# Patient Record
Sex: MALE | Race: WHITE | NOT HISPANIC OR LATINO | URBAN - METROPOLITAN AREA
[De-identification: names, ages, dates, MRNs, and addresses within clinical notes are randomized per-mention and may not be internally consistent; named-entity substitution may affect disease eponyms.]

---

## 2023-07-19 ENCOUNTER — PRE-ADMISSION TESTING (OUTPATIENT)
Dept: PREADMISSION TESTING | Facility: HOSPITAL | Age: 48
End: 2023-07-19
Payer: COMMERCIAL

## 2023-07-19 VITALS — BODY MASS INDEX: 36.64 KG/M2 | WEIGHT: 276.46 LBS | HEIGHT: 73 IN

## 2023-07-19 LAB
ALBUMIN SERPL-MCNC: 4.2 G/DL (ref 3.5–5.2)
ALBUMIN/GLOB SERPL: 1.4 G/DL
ALP SERPL-CCNC: 98 U/L (ref 39–117)
ALT SERPL W P-5'-P-CCNC: 35 U/L (ref 1–41)
ANION GAP SERPL CALCULATED.3IONS-SCNC: 12 MMOL/L (ref 5–15)
AST SERPL-CCNC: 25 U/L (ref 1–40)
BILIRUB SERPL-MCNC: 0.6 MG/DL (ref 0–1.2)
BUN SERPL-MCNC: 9 MG/DL (ref 6–20)
BUN/CREAT SERPL: 9.7 (ref 7–25)
CALCIUM SPEC-SCNC: 9.1 MG/DL (ref 8.6–10.5)
CHLORIDE SERPL-SCNC: 105 MMOL/L (ref 98–107)
CO2 SERPL-SCNC: 24 MMOL/L (ref 22–29)
CREAT SERPL-MCNC: 0.93 MG/DL (ref 0.76–1.27)
DEPRECATED RDW RBC AUTO: 46.4 FL (ref 37–54)
EGFRCR SERPLBLD CKD-EPI 2021: 100 ML/MIN/1.73
ERYTHROCYTE [DISTWIDTH] IN BLOOD BY AUTOMATED COUNT: 14.5 % (ref 12.3–15.4)
GLOBULIN UR ELPH-MCNC: 3 GM/DL
GLUCOSE SERPL-MCNC: 100 MG/DL (ref 65–99)
HBA1C MFR BLD: 5.3 % (ref 4.8–5.6)
HCT VFR BLD AUTO: 48 % (ref 37.5–51)
HGB BLD-MCNC: 15.6 G/DL (ref 13–17.7)
MCH RBC QN AUTO: 28.4 PG (ref 26.6–33)
MCHC RBC AUTO-ENTMCNC: 32.5 G/DL (ref 31.5–35.7)
MCV RBC AUTO: 87.3 FL (ref 79–97)
PLATELET # BLD AUTO: 234 10*3/MM3 (ref 140–450)
PMV BLD AUTO: 10.1 FL (ref 6–12)
POTASSIUM SERPL-SCNC: 3.6 MMOL/L (ref 3.5–5.2)
PROT SERPL-MCNC: 7.2 G/DL (ref 6–8.5)
RBC # BLD AUTO: 5.5 10*6/MM3 (ref 4.14–5.8)
SODIUM SERPL-SCNC: 141 MMOL/L (ref 136–145)
WBC NRBC COR # BLD: 8.72 10*3/MM3 (ref 3.4–10.8)

## 2023-07-19 PROCEDURE — 36415 COLL VENOUS BLD VENIPUNCTURE: CPT

## 2023-07-19 PROCEDURE — 93005 ELECTROCARDIOGRAM TRACING: CPT

## 2023-07-19 PROCEDURE — 80053 COMPREHEN METABOLIC PANEL: CPT

## 2023-07-19 PROCEDURE — 85027 COMPLETE CBC AUTOMATED: CPT

## 2023-07-19 PROCEDURE — 83036 HEMOGLOBIN GLYCOSYLATED A1C: CPT

## 2023-07-19 RX ORDER — HEPARIN SODIUM 5000 [USP'U]/ML
5000 INJECTION, SOLUTION INTRAVENOUS; SUBCUTANEOUS ONCE
Status: CANCELLED | OUTPATIENT
Start: 2023-07-19 | End: 2023-07-19

## 2023-07-19 RX ORDER — LISINOPRIL 40 MG/1
40 TABLET ORAL
Status: ON HOLD | COMMUNITY
Start: 2023-04-07

## 2023-07-19 RX ORDER — ACETAMINOPHEN 500 MG
1000 TABLET ORAL ONCE
Status: CANCELLED | OUTPATIENT
Start: 2023-07-19 | End: 2023-07-19

## 2023-07-19 RX ORDER — MELOXICAM 15 MG/1
15 TABLET ORAL ONCE
Status: CANCELLED | OUTPATIENT
Start: 2023-07-19 | End: 2023-07-19

## 2023-07-19 RX ORDER — OMEPRAZOLE 20 MG/1
20 CAPSULE, DELAYED RELEASE ORAL DAILY
Status: ON HOLD | COMMUNITY

## 2023-07-19 RX ORDER — AMLODIPINE BESYLATE 10 MG/1
1 TABLET ORAL DAILY
Status: ON HOLD | COMMUNITY
Start: 2023-06-21

## 2023-07-19 RX ORDER — CARVEDILOL 6.25 MG/1
6.25 TABLET ORAL DAILY
Status: ON HOLD | COMMUNITY
Start: 2023-06-21

## 2023-07-19 RX ORDER — PREGABALIN 75 MG/1
75 CAPSULE ORAL ONCE
Status: CANCELLED | OUTPATIENT
Start: 2023-07-19 | End: 2023-07-19

## 2023-07-19 RX ORDER — SCOLOPAMINE TRANSDERMAL SYSTEM 1 MG/1
1 PATCH, EXTENDED RELEASE TRANSDERMAL CONTINUOUS
Status: CANCELLED | OUTPATIENT
Start: 2023-07-19 | End: 2023-07-22

## 2023-07-19 NOTE — PAT
"Patient viewed general PAT education video as instructed in their preoperative information received from their surgeon.  Patient stated the general PAT education video was viewed in its entirety and survey completed.  Copies of PAT general education handouts (Incentive Spirometry, Meds to Beds Program, Patient Belongings, Pre-op skin preparation instructions, Blood Glucose testing, Visitor policy, Surgery FAQ, Code H) distributed to patient if not printed. Education related to the PAT pass and skin preparation for surgery (if applicable) completed in PAT as a reinforcement to PAT education video. Patient instructed to return PAT pass provided today as well as completed skin preparation sheet (if applicable) on the day of procedure.     Additionally if patient had not viewed video yet but intended to view it at home or in our waiting area, then referred them to the handout with QR code/link provided during PAT visit.  Instructed patient to complete survey after viewing the video in its entirety.  Encouraged patient/family to read [facility] general education handouts thoroughly and notify PAT staff with any questions or concerns. Patient verbalized understanding of all information and priority content.    Patient to apply Chlorhexadine wipes  to surgical area (as instructed) the night before procedure and the AM of procedure. Wipes provided.    Patient instructed to drink 20 ounces of Gatorade and it needs to be completed 1 hour (for Main OR patients) or 2 hours (scheduled  section & [facility] patients) before given arrival time for procedure (NO RED Gatorade)    Patient verbalized understanding.    Ten (8 ounce) Impact Advanced Recovery Nutritional Drinks distributed to patient from Pre Admission Testing Department.  Verbal and written instructions given that patient must consume two (8 ounce) Impact drinks a day for five days before surgery.  Flavoring tip sheet provided as well the brochure \"Go in Stronger. Get " "Home Sooner\" that explains benefits of nutritional drinks to enhance recovery. Patient/family verbalized understanding.     Per Anesthesia Request, patient instructed not to take their ACE/ARB medications on the AM of surgery.    Left message for Heather DURAN (GI Nurse Navigator) of upcoming surgery.       "

## 2023-07-27 LAB
QT INTERVAL: 394 MS
QTC INTERVAL: 449 MS

## 2023-07-30 ENCOUNTER — ANESTHESIA EVENT (OUTPATIENT)
Dept: PERIOP | Facility: HOSPITAL | Age: 48
DRG: 331 | End: 2023-07-30
Payer: COMMERCIAL

## 2023-07-30 RX ORDER — FAMOTIDINE 10 MG/ML
20 INJECTION, SOLUTION INTRAVENOUS ONCE
Status: CANCELLED | OUTPATIENT
Start: 2023-07-30 | End: 2023-07-30

## 2023-07-31 ENCOUNTER — ANESTHESIA EVENT CONVERTED (OUTPATIENT)
Dept: ANESTHESIOLOGY | Facility: HOSPITAL | Age: 48
DRG: 331 | End: 2023-07-31
Payer: COMMERCIAL

## 2023-07-31 ENCOUNTER — HOSPITAL ENCOUNTER (INPATIENT)
Facility: HOSPITAL | Age: 48
LOS: 3 days | Discharge: HOME | DRG: 331 | End: 2023-08-03
Attending: SURGERY | Admitting: SURGERY
Payer: COMMERCIAL

## 2023-07-31 ENCOUNTER — ANESTHESIA (OUTPATIENT)
Dept: PERIOP | Facility: HOSPITAL | Age: 48
DRG: 331 | End: 2023-07-31
Payer: COMMERCIAL

## 2023-07-31 DIAGNOSIS — K63.89 COLONIC MASS: ICD-10-CM

## 2023-07-31 PROCEDURE — 25010000002 CEFOXITIN PER 1 G: Performed by: SURGERY

## 2023-07-31 PROCEDURE — 25010000002 DEXAMETHASONE PER 1 MG: Performed by: NURSE ANESTHETIST, CERTIFIED REGISTERED

## 2023-07-31 PROCEDURE — 25010000002 BUPIVACAINE (PF) 0.25 % SOLUTION: Performed by: NURSE ANESTHETIST, CERTIFIED REGISTERED

## 2023-07-31 PROCEDURE — 25010000002 FENTANYL CITRATE (PF) 100 MCG/2ML SOLUTION: Performed by: NURSE ANESTHETIST, CERTIFIED REGISTERED

## 2023-07-31 PROCEDURE — 25010000002 BUPIVACAINE (PF) 0.5 % SOLUTION: Performed by: SURGERY

## 2023-07-31 PROCEDURE — 25010000002 NEOSTIGMINE 10 MG/10ML SOLUTION: Performed by: NURSE ANESTHETIST, CERTIFIED REGISTERED

## 2023-07-31 PROCEDURE — 25010000002 DEXAMETHASONE SODIUM PHOSPHATE 10 MG/ML SOLUTION: Performed by: NURSE ANESTHETIST, CERTIFIED REGISTERED

## 2023-07-31 PROCEDURE — 25010000002 FENTANYL CITRATE (PF) 50 MCG/ML SOLUTION

## 2023-07-31 PROCEDURE — 88307 TISSUE EXAM BY PATHOLOGIST: CPT | Performed by: SURGERY

## 2023-07-31 PROCEDURE — 25010000002 PROPOFOL 10 MG/ML EMULSION: Performed by: NURSE ANESTHETIST, CERTIFIED REGISTERED

## 2023-07-31 PROCEDURE — 0DTF4ZZ RESECTION OF RIGHT LARGE INTESTINE, PERCUTANEOUS ENDOSCOPIC APPROACH: ICD-10-PCS | Performed by: SURGERY

## 2023-07-31 PROCEDURE — 25010000002 ONDANSETRON PER 1 MG: Performed by: NURSE ANESTHETIST, CERTIFIED REGISTERED

## 2023-07-31 DEVICE — LIGACLIP 10-M/L, 10MM ENDOSCOPIC ROTATING MULTIPLE CLIP APPLIERS
Type: IMPLANTABLE DEVICE | Site: ABDOMEN | Status: FUNCTIONAL
Brand: LIGACLIP

## 2023-07-31 DEVICE — ENDOPATH ECHELON VASCULAR  RELOADS, WHITE, 35MM
Type: IMPLANTABLE DEVICE | Site: ABDOMEN | Status: FUNCTIONAL
Brand: ECHELON ENDOPATH

## 2023-07-31 DEVICE — PROXIMATE LINEAR CUTTER RELOAD, BLUE, 75MM
Type: IMPLANTABLE DEVICE | Site: ABDOMEN | Status: FUNCTIONAL
Brand: PROXIMATE

## 2023-07-31 DEVICE — PROXIMATE RELOADABLE LINEAR CUTTER WITH SAFETY LOCK-OUT, 75MM
Type: IMPLANTABLE DEVICE | Site: ABDOMEN | Status: FUNCTIONAL
Brand: PROXIMATE

## 2023-07-31 RX ORDER — DEXAMETHASONE SODIUM PHOSPHATE 4 MG/ML
INJECTION, SOLUTION INTRA-ARTICULAR; INTRALESIONAL; INTRAMUSCULAR; INTRAVENOUS; SOFT TISSUE AS NEEDED
Status: DISCONTINUED | OUTPATIENT
Start: 2023-07-31 | End: 2023-07-31 | Stop reason: SURG

## 2023-07-31 RX ORDER — ONDANSETRON 2 MG/ML
4 INJECTION INTRAMUSCULAR; INTRAVENOUS EVERY 6 HOURS PRN
Status: DISCONTINUED | OUTPATIENT
Start: 2023-07-31 | End: 2023-08-01

## 2023-07-31 RX ORDER — AMLODIPINE BESYLATE 10 MG/1
10 TABLET ORAL
Status: DISCONTINUED | OUTPATIENT
Start: 2023-07-31 | End: 2023-08-03 | Stop reason: HOSPADM

## 2023-07-31 RX ORDER — MELOXICAM 7.5 MG/1
7.5 TABLET ORAL DAILY
Status: DISCONTINUED | OUTPATIENT
Start: 2023-08-01 | End: 2023-08-03 | Stop reason: HOSPADM

## 2023-07-31 RX ORDER — CARVEDILOL 6.25 MG/1
6.25 TABLET ORAL
Status: DISCONTINUED | OUTPATIENT
Start: 2023-07-31 | End: 2023-08-03 | Stop reason: HOSPADM

## 2023-07-31 RX ORDER — SODIUM CHLORIDE 0.9 % (FLUSH) 0.9 %
10 SYRINGE (ML) INJECTION AS NEEDED
Status: DISCONTINUED | OUTPATIENT
Start: 2023-07-31 | End: 2023-07-31 | Stop reason: HOSPADM

## 2023-07-31 RX ORDER — HYDROMORPHONE HYDROCHLORIDE 1 MG/ML
0.5 INJECTION, SOLUTION INTRAMUSCULAR; INTRAVENOUS; SUBCUTANEOUS
Status: DISCONTINUED | OUTPATIENT
Start: 2023-07-31 | End: 2023-07-31 | Stop reason: HOSPADM

## 2023-07-31 RX ORDER — DEXAMETHASONE SODIUM PHOSPHATE 10 MG/ML
INJECTION, SOLUTION INTRAMUSCULAR; INTRAVENOUS
Status: COMPLETED | OUTPATIENT
Start: 2023-07-31 | End: 2023-07-31

## 2023-07-31 RX ORDER — PROMETHAZINE HYDROCHLORIDE 12.5 MG/1
6.25 SUPPOSITORY RECTAL EVERY 6 HOURS PRN
Status: DISCONTINUED | OUTPATIENT
Start: 2023-07-31 | End: 2023-08-03 | Stop reason: HOSPADM

## 2023-07-31 RX ORDER — BUPIVACAINE HYDROCHLORIDE 5 MG/ML
INJECTION, SOLUTION EPIDURAL; INTRACAUDAL AS NEEDED
Status: DISCONTINUED | OUTPATIENT
Start: 2023-07-31 | End: 2023-07-31 | Stop reason: HOSPADM

## 2023-07-31 RX ORDER — FENTANYL CITRATE 50 UG/ML
INJECTION, SOLUTION INTRAMUSCULAR; INTRAVENOUS
Status: COMPLETED
Start: 2023-07-31 | End: 2023-07-31

## 2023-07-31 RX ORDER — ACETAMINOPHEN 500 MG
1000 TABLET ORAL ONCE
Status: COMPLETED | OUTPATIENT
Start: 2023-07-31 | End: 2023-07-31

## 2023-07-31 RX ORDER — LIDOCAINE HYDROCHLORIDE 10 MG/ML
0.5 INJECTION, SOLUTION EPIDURAL; INFILTRATION; INTRACAUDAL; PERINEURAL ONCE AS NEEDED
Status: COMPLETED | OUTPATIENT
Start: 2023-07-31 | End: 2023-07-31

## 2023-07-31 RX ORDER — NALOXONE HCL 0.4 MG/ML
0.4 VIAL (ML) INJECTION
Status: DISCONTINUED | OUTPATIENT
Start: 2023-07-31 | End: 2023-08-03 | Stop reason: HOSPADM

## 2023-07-31 RX ORDER — SODIUM CHLORIDE 9 MG/ML
INJECTION, SOLUTION INTRAVENOUS AS NEEDED
Status: DISCONTINUED | OUTPATIENT
Start: 2023-07-31 | End: 2023-07-31 | Stop reason: HOSPADM

## 2023-07-31 RX ORDER — ONDANSETRON 2 MG/ML
INJECTION INTRAMUSCULAR; INTRAVENOUS AS NEEDED
Status: DISCONTINUED | OUTPATIENT
Start: 2023-07-31 | End: 2023-07-31 | Stop reason: SURG

## 2023-07-31 RX ORDER — SODIUM CHLORIDE 0.9 % (FLUSH) 0.9 %
10 SYRINGE (ML) INJECTION EVERY 12 HOURS SCHEDULED
Status: DISCONTINUED | OUTPATIENT
Start: 2023-07-31 | End: 2023-07-31 | Stop reason: HOSPADM

## 2023-07-31 RX ORDER — MELOXICAM 15 MG/1
15 TABLET ORAL ONCE
Status: COMPLETED | OUTPATIENT
Start: 2023-07-31 | End: 2023-07-31

## 2023-07-31 RX ORDER — MIDAZOLAM HYDROCHLORIDE 1 MG/ML
1 INJECTION INTRAMUSCULAR; INTRAVENOUS
Status: DISCONTINUED | OUTPATIENT
Start: 2023-07-31 | End: 2023-07-31 | Stop reason: HOSPADM

## 2023-07-31 RX ORDER — LIDOCAINE HYDROCHLORIDE 10 MG/ML
INJECTION, SOLUTION EPIDURAL; INFILTRATION; INTRACAUDAL; PERINEURAL AS NEEDED
Status: DISCONTINUED | OUTPATIENT
Start: 2023-07-31 | End: 2023-07-31 | Stop reason: SURG

## 2023-07-31 RX ORDER — HYDROCODONE BITARTRATE AND ACETAMINOPHEN 5; 325 MG/1; MG/1
1 TABLET ORAL EVERY 4 HOURS PRN
Status: DISCONTINUED | OUTPATIENT
Start: 2023-07-31 | End: 2023-08-03 | Stop reason: HOSPADM

## 2023-07-31 RX ORDER — HEPARIN SODIUM 5000 [USP'U]/ML
5000 INJECTION, SOLUTION INTRAVENOUS; SUBCUTANEOUS ONCE
Status: DISCONTINUED | OUTPATIENT
Start: 2023-07-31 | End: 2023-07-31 | Stop reason: HOSPADM

## 2023-07-31 RX ORDER — ACETAMINOPHEN 325 MG/1
650 TABLET ORAL EVERY 4 HOURS PRN
Status: DISCONTINUED | OUTPATIENT
Start: 2023-07-31 | End: 2023-08-03 | Stop reason: HOSPADM

## 2023-07-31 RX ORDER — FAMOTIDINE 20 MG/1
20 TABLET, FILM COATED ORAL ONCE
Status: COMPLETED | OUTPATIENT
Start: 2023-07-31 | End: 2023-07-31

## 2023-07-31 RX ORDER — ALBUTEROL SULFATE 90 UG/1
AEROSOL, METERED RESPIRATORY (INHALATION) AS NEEDED
Status: DISCONTINUED | OUTPATIENT
Start: 2023-07-31 | End: 2023-07-31 | Stop reason: SURG

## 2023-07-31 RX ORDER — SODIUM CHLORIDE, SODIUM LACTATE, POTASSIUM CHLORIDE, CALCIUM CHLORIDE 600; 310; 30; 20 MG/100ML; MG/100ML; MG/100ML; MG/100ML
75 INJECTION, SOLUTION INTRAVENOUS CONTINUOUS
Status: DISCONTINUED | OUTPATIENT
Start: 2023-07-31 | End: 2023-08-02

## 2023-07-31 RX ORDER — SCOLOPAMINE TRANSDERMAL SYSTEM 1 MG/1
1 PATCH, EXTENDED RELEASE TRANSDERMAL CONTINUOUS
Status: DISCONTINUED | OUTPATIENT
Start: 2023-07-31 | End: 2023-08-02

## 2023-07-31 RX ORDER — PROMETHAZINE HYDROCHLORIDE 12.5 MG/1
6.25 TABLET ORAL EVERY 6 HOURS PRN
Status: DISCONTINUED | OUTPATIENT
Start: 2023-07-31 | End: 2023-08-03 | Stop reason: HOSPADM

## 2023-07-31 RX ORDER — BUPIVACAINE HYDROCHLORIDE 2.5 MG/ML
INJECTION, SOLUTION EPIDURAL; INFILTRATION; INTRACAUDAL
Status: COMPLETED | OUTPATIENT
Start: 2023-07-31 | End: 2023-07-31

## 2023-07-31 RX ORDER — MORPHINE SULFATE 4 MG/ML
4 INJECTION, SOLUTION INTRAMUSCULAR; INTRAVENOUS
Status: DISCONTINUED | OUTPATIENT
Start: 2023-07-31 | End: 2023-08-01

## 2023-07-31 RX ORDER — PROPOFOL 10 MG/ML
VIAL (ML) INTRAVENOUS AS NEEDED
Status: DISCONTINUED | OUTPATIENT
Start: 2023-07-31 | End: 2023-07-31 | Stop reason: SURG

## 2023-07-31 RX ORDER — MAGNESIUM HYDROXIDE 1200 MG/15ML
LIQUID ORAL AS NEEDED
Status: DISCONTINUED | OUTPATIENT
Start: 2023-07-31 | End: 2023-07-31 | Stop reason: HOSPADM

## 2023-07-31 RX ORDER — ROCURONIUM BROMIDE 10 MG/ML
INJECTION, SOLUTION INTRAVENOUS AS NEEDED
Status: DISCONTINUED | OUTPATIENT
Start: 2023-07-31 | End: 2023-07-31 | Stop reason: SURG

## 2023-07-31 RX ORDER — GLYCOPYRROLATE 0.2 MG/ML
INJECTION INTRAMUSCULAR; INTRAVENOUS AS NEEDED
Status: DISCONTINUED | OUTPATIENT
Start: 2023-07-31 | End: 2023-07-31 | Stop reason: SURG

## 2023-07-31 RX ORDER — SODIUM CHLORIDE 9 MG/ML
40 INJECTION, SOLUTION INTRAVENOUS AS NEEDED
Status: DISCONTINUED | OUTPATIENT
Start: 2023-07-31 | End: 2023-07-31 | Stop reason: HOSPADM

## 2023-07-31 RX ORDER — ONDANSETRON 4 MG/1
4 TABLET, FILM COATED ORAL EVERY 6 HOURS PRN
Status: DISCONTINUED | OUTPATIENT
Start: 2023-07-31 | End: 2023-08-03 | Stop reason: HOSPADM

## 2023-07-31 RX ORDER — EPHEDRINE SULFATE 50 MG/ML
INJECTION INTRAVENOUS AS NEEDED
Status: DISCONTINUED | OUTPATIENT
Start: 2023-07-31 | End: 2023-07-31 | Stop reason: SURG

## 2023-07-31 RX ORDER — FENTANYL CITRATE 50 UG/ML
50 INJECTION, SOLUTION INTRAMUSCULAR; INTRAVENOUS
Status: DISCONTINUED | OUTPATIENT
Start: 2023-07-31 | End: 2023-07-31 | Stop reason: HOSPADM

## 2023-07-31 RX ORDER — FENTANYL CITRATE 50 UG/ML
INJECTION, SOLUTION INTRAMUSCULAR; INTRAVENOUS AS NEEDED
Status: DISCONTINUED | OUTPATIENT
Start: 2023-07-31 | End: 2023-07-31 | Stop reason: SURG

## 2023-07-31 RX ORDER — ALVIMOPAN 12 MG/1
12 CAPSULE ORAL ONCE
Status: COMPLETED | OUTPATIENT
Start: 2023-07-31 | End: 2023-07-31

## 2023-07-31 RX ORDER — PANTOPRAZOLE SODIUM 40 MG/1
40 TABLET, DELAYED RELEASE ORAL
Status: DISCONTINUED | OUTPATIENT
Start: 2023-08-01 | End: 2023-08-03 | Stop reason: HOSPADM

## 2023-07-31 RX ORDER — PREGABALIN 75 MG/1
75 CAPSULE ORAL ONCE
Status: COMPLETED | OUTPATIENT
Start: 2023-07-31 | End: 2023-07-31

## 2023-07-31 RX ORDER — NEOSTIGMINE METHYLSULFATE 1 MG/ML
INJECTION, SOLUTION INTRAVENOUS AS NEEDED
Status: DISCONTINUED | OUTPATIENT
Start: 2023-07-31 | End: 2023-07-31 | Stop reason: SURG

## 2023-07-31 RX ORDER — SODIUM CHLORIDE, SODIUM LACTATE, POTASSIUM CHLORIDE, CALCIUM CHLORIDE 600; 310; 30; 20 MG/100ML; MG/100ML; MG/100ML; MG/100ML
9 INJECTION, SOLUTION INTRAVENOUS CONTINUOUS
Status: DISCONTINUED | OUTPATIENT
Start: 2023-07-31 | End: 2023-07-31

## 2023-07-31 RX ADMIN — ALBUTEROL SULFATE 5 PUFF: 90 AEROSOL, METERED RESPIRATORY (INHALATION) at 09:04

## 2023-07-31 RX ADMIN — CEFOXITIN SODIUM 2000 MG: 2 POWDER, FOR SOLUTION INTRAVENOUS at 18:01

## 2023-07-31 RX ADMIN — ROCURONIUM BROMIDE 80 MG: 10 SOLUTION INTRAVENOUS at 08:07

## 2023-07-31 RX ADMIN — GLYCOPYRROLATE 0.4 MG: 0.4 INJECTION INTRAMUSCULAR; INTRAVENOUS at 12:11

## 2023-07-31 RX ADMIN — BUPIVACAINE HYDROCHLORIDE 60 ML: 2.5 INJECTION, SOLUTION EPIDURAL; INFILTRATION; INTRACAUDAL; PERINEURAL at 08:11

## 2023-07-31 RX ADMIN — ROCURONIUM BROMIDE 10 MG: 10 SOLUTION INTRAVENOUS at 09:45

## 2023-07-31 RX ADMIN — DEXAMETHASONE SODIUM PHOSPHATE 8 MG: 4 INJECTION, SOLUTION INTRAMUSCULAR; INTRAVENOUS at 08:12

## 2023-07-31 RX ADMIN — EPHEDRINE SULFATE 10 MG: 50 INJECTION INTRAVENOUS at 08:33

## 2023-07-31 RX ADMIN — FENTANYL CITRATE 100 MCG: 50 INJECTION, SOLUTION INTRAMUSCULAR; INTRAVENOUS at 08:06

## 2023-07-31 RX ADMIN — FENTANYL CITRATE 100 MCG: 50 INJECTION, SOLUTION INTRAMUSCULAR; INTRAVENOUS at 12:15

## 2023-07-31 RX ADMIN — ALVIMOPAN 12 MG: 12 CAPSULE ORAL at 07:11

## 2023-07-31 RX ADMIN — SODIUM CHLORIDE, POTASSIUM CHLORIDE, SODIUM LACTATE AND CALCIUM CHLORIDE 9 ML/HR: 600; 310; 30; 20 INJECTION, SOLUTION INTRAVENOUS at 07:12

## 2023-07-31 RX ADMIN — AMLODIPINE BESYLATE 10 MG: 10 TABLET ORAL at 18:01

## 2023-07-31 RX ADMIN — PREGABALIN 75 MG: 75 CAPSULE ORAL at 07:11

## 2023-07-31 RX ADMIN — ACETAMINOPHEN 1000 MG: 500 TABLET ORAL at 07:11

## 2023-07-31 RX ADMIN — LIDOCAINE HYDROCHLORIDE 100 MG: 10 INJECTION, SOLUTION EPIDURAL; INFILTRATION; INTRACAUDAL; PERINEURAL at 08:06

## 2023-07-31 RX ADMIN — ROCURONIUM BROMIDE 20 MG: 10 SOLUTION INTRAVENOUS at 09:00

## 2023-07-31 RX ADMIN — CEFOXITIN SODIUM 2000 MG: 2 POWDER, FOR SOLUTION INTRAVENOUS at 08:06

## 2023-07-31 RX ADMIN — ALBUTEROL SULFATE 5 PUFF: 90 AEROSOL, METERED RESPIRATORY (INHALATION) at 08:30

## 2023-07-31 RX ADMIN — FAMOTIDINE 20 MG: 20 TABLET ORAL at 07:11

## 2023-07-31 RX ADMIN — SODIUM CHLORIDE, POTASSIUM CHLORIDE, SODIUM LACTATE AND CALCIUM CHLORIDE: 600; 310; 30; 20 INJECTION, SOLUTION INTRAVENOUS at 09:59

## 2023-07-31 RX ADMIN — FENTANYL CITRATE 100 MCG: 50 INJECTION, SOLUTION INTRAMUSCULAR; INTRAVENOUS at 09:57

## 2023-07-31 RX ADMIN — CEFOXITIN SODIUM 2000 MG: 2 POWDER, FOR SOLUTION INTRAVENOUS at 10:00

## 2023-07-31 RX ADMIN — DEXAMETHASONE SODIUM PHOSPHATE 4 MG: 10 INJECTION, SOLUTION INTRAMUSCULAR; INTRAVENOUS at 08:11

## 2023-07-31 RX ADMIN — HYDROCODONE BITARTRATE AND ACETAMINOPHEN 1 TABLET: 5; 325 TABLET ORAL at 18:01

## 2023-07-31 RX ADMIN — PROPOFOL 200 MG: 10 INJECTION, EMULSION INTRAVENOUS at 08:07

## 2023-07-31 RX ADMIN — LIDOCAINE HYDROCHLORIDE 0.5 ML: 10 INJECTION, SOLUTION EPIDURAL; INFILTRATION; INTRACAUDAL; PERINEURAL at 07:11

## 2023-07-31 RX ADMIN — SCOPOLAMINE 1 PATCH: 1.5 PATCH, EXTENDED RELEASE TRANSDERMAL at 07:11

## 2023-07-31 RX ADMIN — ROCURONIUM BROMIDE 10 MG: 10 SOLUTION INTRAVENOUS at 10:45

## 2023-07-31 RX ADMIN — FENTANYL CITRATE 50 MCG: 50 INJECTION, SOLUTION INTRAMUSCULAR; INTRAVENOUS at 13:20

## 2023-07-31 RX ADMIN — CARVEDILOL 6.25 MG: 6.25 TABLET, FILM COATED ORAL at 18:01

## 2023-07-31 RX ADMIN — ONDANSETRON 4 MG: 2 INJECTION INTRAMUSCULAR; INTRAVENOUS at 12:11

## 2023-07-31 RX ADMIN — ROCURONIUM BROMIDE 10 MG: 10 SOLUTION INTRAVENOUS at 11:46

## 2023-07-31 RX ADMIN — SODIUM CHLORIDE, POTASSIUM CHLORIDE, SODIUM LACTATE AND CALCIUM CHLORIDE 125 ML/HR: 600; 310; 30; 20 INJECTION, SOLUTION INTRAVENOUS at 15:37

## 2023-07-31 RX ADMIN — NEOSTIGMINE 3 MG: 1 INJECTION INTRAVENOUS at 12:11

## 2023-07-31 RX ADMIN — MELOXICAM 15 MG: 15 TABLET ORAL at 07:11

## 2023-07-31 NOTE — H&P
Patient Care Team:      Chief complaint: Colon mass    Subjective:  Patient is a 48 y.o.male presents with a recently discovered colon mass found during routine colonoscopy 6/18/23. He denies any previous symptoms related to the bowel. No recent changes in his general health. No previous abdominal surgery.    Review of Systems:  General ROS: negative  Cardiovascular ROS: negative  Respiratory ROS: no cough, shortness of breath, or wheezing      Allergies: No Known Allergies       Latex:no  Contrast Dye: no    Home Meds    Medications Prior to Admission   Medication Sig Dispense Refill Last Dose    amLODIPine (NORVASC) 10 MG tablet Take 1 tablet by mouth Daily.       carvedilol (COREG) 6.25 MG tablet Take 1 tablet by mouth Daily.       lisinopril (PRINIVIL,ZESTRIL) 40 MG tablet Take 1 tablet by mouth Every Afternoon.       omeprazole (priLOSEC) 20 MG capsule Take 1 capsule by mouth Daily.       POTASSIUM PO Take 99 mg by mouth Daily.        PMH:   Past Medical History:   Diagnosis Date    GERD (gastroesophageal reflux disease)     History of heart murmur in childhood     at age 12    Hypertension      PSH:    Past Surgical History:   Procedure Laterality Date    COLONOSCOPY      TONSILLECTOMY      WISDOM TOOTH EXTRACTION       Immunization History: pneumo: no   Flu: no  Tetanus: yes  Social History:   Tobacco: yes   Alcohol: yes      Physical Exam:There were no vitals taken for this visit.      General Appearance:    Alert, cooperative, no distress, appears stated age   Head:    Normocephalic, without obvious abnormality, atraumatic   Lungs:     Clear to auscultation bilaterally, respirations unlabored    Heart: Regular rate and rhythm, S1 and S2 normal, no murmur, rub    or gallop    Abdomen:    Soft without tenderness, obese   Breast Exam:    deferred   Genitalia:    deferred   Extremities:   Extremities normal, atraumatic, no cyanosis or edema   Skin:   Skin color, texture, turgor normal, no rashes or lesions    Neurologic:   Grossly intact     Results Review: CBC, Chem profile, EKG on chart.    Impression: Colon mass    Plan: For right laparoscopic hemicolectomy possible open, today    MONTANA Bonilla 7/31/2023 06:52 EDT    I have reviewed the above note, my prior note(s), appropriate imaging, and labs.  I have again discussed the risks and benefits of laparoscopic right hemicolectomy possible open with the patient.  All of their questions have been answered.  They understand and wish to proceed with surgical intervention.

## 2023-07-31 NOTE — OP NOTE
General Surgery Operative Note    Nhan Fonseca  [id number]  1975    Date of Surgery:  7/31/2023 12:27 EDT    Pre-op Diagnosis: Right colonic mass    Post-op Diagnosis: Right colonic mass    Procedure: Laparoscopic right hemicolectomy    Surgeon: Gera Lyles MD    Circulator: Christy Dodd RN; Tara Gardner RN  Scrub Person: Meyr Mancuso; Darcy Montesinos  Nursing Assistant: Velvet Shaw PCT  Assistant: Scooter Doyle PA     Assistant: Scooter Doyle PA  was responsible for performing the following activities: Retraction, Suction, Irrigation, Suturing, and Closing and their skilled assistance was necessary for the success of this case.    Anesthesia: General with bilateral tap blocks    Fluids: 1200 mL of crystalloid    Estimated Blood Loss: Less than 100 mL    Urine Voided: Greater than 100 mL    Specimens: Right colon                  Drains: None    Findings: Right colon mass, tattooed                   No obvious colon lesions noted    Complications: None apparent    History:   48-year-old gentleman underwent colonoscopy which demonstrated a mass in the cecum.      The risks and benefits of laparoscopic right hemicolectomy were rehashed.  Our discussion included but was not limited to: bleeding, infection, injury to adjacent viscera (duodenum, right ureter, liver, gallbladder etc.), anastomotic leak, need for reintervention, an open operation in general, and medical issues from a cardiopulmonary and deep venous thrombosis standpoint.  All questions were answered and they understood and wished to proceed with surgical intervention.    Procedure:      After informed consent, the patient was taken to the operating room and placed in the supine position.  Appropriate antibiotic prophylaxis was given to the patient. General anesthesia was induced, bilateral TAP blocks were placed by anesthesia, a Gutiérrez catheter was placed, the abdomen was then prepped and draped in the standard  sterile fashion. An Ioban was placed on the skin. A time out was observed.     The operation began with a infraumbilical New trocar cutdown skin was anesthetized and incised I dissected down to the fascia bluntly.  The fascia was grasped with a Mainor clamp and incised through the peritoneum under direct visualization and bilateral Vicryl fascial sutures were placed.  Patient was placed in the headdown position and a suprapubic and left lower quadrant 5 mm trocars were placed in the standard fashion under direct visualization.  He was then rotated to his left lateral side.  The appendix was visualized and was adherent to the retroperitoneum on the right.  This was dissected free from the surrounding tissue with the [place] LigaSure and retracted cephalad toward the head of the patient and anteriorly.  This tented the ileocolic vessel and the mesentery inferior to this was scored with the electrocautery Bovie.  With the assistance of the [place] LigaSure the retroperitoneal dissection took place up to the level of the duodenum.  The duodenum was swept down into the right.  I then released the right colon from the white line of Toldt with the [place] LigaSure up to the level of the hepatic flexure.  I placed a upper midline 5 mm trocar to assist with retraction and elevation of the transverse colon and omentum.  The omentum was freed from the colon over to the hepatic flexure.  The attachments anterior to the duodenum were swept down the duodenum was well visualized and uninjured.  There were some attachments around the gallbladder which were taken down with the [place] LigaSure.  At this point the colon was well mobilized.  I upsized the upper midline 5 mm trocar to a 11 mm trocar to allow the stapler for the ileocolic vessels in.  I then took the ileocolic vessels with a 35 vascular WEI stapler.  The right colon easily reached the midline.  I then created a periumbilical midline incision in standard fashion.   The Garrett wound protector was placed in standard fashion.  The appendix, right colon, and small bowel were delivered through into the wound.  The specimen incorporated the tattooing quite well.  The colon was then taken with a 75 WEI stapler along with the small bowel in standard fashion.  The stapled ends were oversewn with imbricating 3-0 silk suture.  An side-to-side stapled anastomosis was performed in the standard fashion.  The enterotomy was closed with a layer of a running 3-0 PDS followed by imbricating silk suture, 3 -0.  The right colon was passed off for permanent pathology.  The anastomosis was placed back into the abdomen.  The midline fascial defect was closed with interrupted 0 Vicryl suture.  The abdomen was then reinsufflated.  The anastomosis appeared in good order.  The right upper quadrant was copiously irrigated.  Meticulous hemostasis was obtained.  I did not place a drain.  All trocars were removed under direct visualization.  The upper midline 11 mm port fascial defect was closed with 0 Vicryl.  All skin incisions were closed with 3-0 Vicryl followed by 4-0 Monocryl.  Mastisol, Steri-Strips, Telfa and Tegaderm were placed on the wounds.  The patient tolerated the procedure well and was extubated and transferred to the recovery room in stable condition.     Sterile dressings were placed on the wound.  All lap and needle counts were reported as correct at the end of the procedure x2.  The patient was then transferred to the PACU.    Gera Lyles MD     Date: 7/31/2023  Time: 12:27 EDT

## 2023-07-31 NOTE — PLAN OF CARE
Goal Outcome Evaluation:      Patient has rested on and off since arrival to the floor. Patient received PO pain med at 1800. No complaints of N/V. NG tube in place. Patient was on 02 at 2 L now is on room air. Patient has AMB to the bed from stretcher and in room since arrival. NG tube collection is clear. FC to be removed in the morning. Currently no s/s of distress noted at this time. Will continue to monitor.

## 2023-08-01 LAB
ALBUMIN SERPL-MCNC: 3.8 G/DL (ref 3.5–5.2)
ALBUMIN/GLOB SERPL: 1.4 G/DL
ALP SERPL-CCNC: 86 U/L (ref 39–117)
ALT SERPL W P-5'-P-CCNC: 27 U/L (ref 1–41)
ANION GAP SERPL CALCULATED.3IONS-SCNC: 12 MMOL/L (ref 5–15)
AST SERPL-CCNC: 19 U/L (ref 1–40)
BASOPHILS # BLD AUTO: 0.01 10*3/MM3 (ref 0–0.2)
BASOPHILS NFR BLD AUTO: 0.1 % (ref 0–1.5)
BILIRUB SERPL-MCNC: 0.9 MG/DL (ref 0–1.2)
BUN SERPL-MCNC: 12 MG/DL (ref 6–20)
BUN/CREAT SERPL: 12.4 (ref 7–25)
CALCIUM SPEC-SCNC: 8.7 MG/DL (ref 8.6–10.5)
CHLORIDE SERPL-SCNC: 103 MMOL/L (ref 98–107)
CO2 SERPL-SCNC: 23 MMOL/L (ref 22–29)
CREAT SERPL-MCNC: 0.97 MG/DL (ref 0.76–1.27)
DEPRECATED RDW RBC AUTO: 48.8 FL (ref 37–54)
EGFRCR SERPLBLD CKD-EPI 2021: 95.1 ML/MIN/1.73
EOSINOPHIL # BLD AUTO: 0 10*3/MM3 (ref 0–0.4)
EOSINOPHIL NFR BLD AUTO: 0 % (ref 0.3–6.2)
ERYTHROCYTE [DISTWIDTH] IN BLOOD BY AUTOMATED COUNT: 15.2 % (ref 12.3–15.4)
GLOBULIN UR ELPH-MCNC: 2.7 GM/DL
GLUCOSE SERPL-MCNC: 107 MG/DL (ref 65–99)
HCT VFR BLD AUTO: 44.6 % (ref 37.5–51)
HGB BLD-MCNC: 14.3 G/DL (ref 13–17.7)
IMM GRANULOCYTES # BLD AUTO: 0.1 10*3/MM3 (ref 0–0.05)
IMM GRANULOCYTES NFR BLD AUTO: 0.6 % (ref 0–0.5)
LYMPHOCYTES # BLD AUTO: 1.06 10*3/MM3 (ref 0.7–3.1)
LYMPHOCYTES NFR BLD AUTO: 6.2 % (ref 19.6–45.3)
MCH RBC QN AUTO: 28.1 PG (ref 26.6–33)
MCHC RBC AUTO-ENTMCNC: 32.1 G/DL (ref 31.5–35.7)
MCV RBC AUTO: 87.6 FL (ref 79–97)
MONOCYTES # BLD AUTO: 1.31 10*3/MM3 (ref 0.1–0.9)
MONOCYTES NFR BLD AUTO: 7.7 % (ref 5–12)
NEUTROPHILS NFR BLD AUTO: 14.54 10*3/MM3 (ref 1.7–7)
NEUTROPHILS NFR BLD AUTO: 85.4 % (ref 42.7–76)
NRBC BLD AUTO-RTO: 0 /100 WBC (ref 0–0.2)
PLATELET # BLD AUTO: 257 10*3/MM3 (ref 140–450)
PMV BLD AUTO: 9.9 FL (ref 6–12)
POTASSIUM SERPL-SCNC: 4.3 MMOL/L (ref 3.5–5.2)
PROT SERPL-MCNC: 6.5 G/DL (ref 6–8.5)
RBC # BLD AUTO: 5.09 10*6/MM3 (ref 4.14–5.8)
SODIUM SERPL-SCNC: 138 MMOL/L (ref 136–145)
WBC NRBC COR # BLD: 17.02 10*3/MM3 (ref 3.4–10.8)

## 2023-08-01 PROCEDURE — 94799 UNLISTED PULMONARY SVC/PX: CPT

## 2023-08-01 PROCEDURE — 25010000002 HEPARIN (PORCINE) PER 1000 UNITS: Performed by: SURGERY

## 2023-08-01 PROCEDURE — 80053 COMPREHEN METABOLIC PANEL: CPT | Performed by: SURGERY

## 2023-08-01 PROCEDURE — 85025 COMPLETE CBC W/AUTO DIFF WBC: CPT | Performed by: SURGERY

## 2023-08-01 PROCEDURE — 25010000002 CEFOXITIN PER 1 G: Performed by: SURGERY

## 2023-08-01 RX ORDER — HEPARIN SODIUM 5000 [USP'U]/ML
5000 INJECTION, SOLUTION INTRAVENOUS; SUBCUTANEOUS EVERY 8 HOURS SCHEDULED
Status: DISCONTINUED | OUTPATIENT
Start: 2023-08-01 | End: 2023-08-03 | Stop reason: HOSPADM

## 2023-08-01 RX ORDER — CARISOPRODOL 350 MG/1
350 TABLET ORAL EVERY 12 HOURS PRN
Status: DISCONTINUED | OUTPATIENT
Start: 2023-08-01 | End: 2023-08-03 | Stop reason: HOSPADM

## 2023-08-01 RX ORDER — LISINOPRIL 40 MG/1
40 TABLET ORAL
Status: DISCONTINUED | OUTPATIENT
Start: 2023-08-01 | End: 2023-08-03 | Stop reason: HOSPADM

## 2023-08-01 RX ADMIN — PANTOPRAZOLE SODIUM 40 MG: 40 TABLET, DELAYED RELEASE ORAL at 05:46

## 2023-08-01 RX ADMIN — HYDROCODONE BITARTRATE AND ACETAMINOPHEN 1 TABLET: 5; 325 TABLET ORAL at 21:10

## 2023-08-01 RX ADMIN — HEPARIN SODIUM 5000 UNITS: 5000 INJECTION INTRAVENOUS; SUBCUTANEOUS at 20:20

## 2023-08-01 RX ADMIN — CARISOPRODOL 350 MG: 350 TABLET ORAL at 16:49

## 2023-08-01 RX ADMIN — CEFOXITIN SODIUM 2000 MG: 2 POWDER, FOR SOLUTION INTRAVENOUS at 17:00

## 2023-08-01 RX ADMIN — CEFOXITIN SODIUM 2000 MG: 2 POWDER, FOR SOLUTION INTRAVENOUS at 05:45

## 2023-08-01 RX ADMIN — LISINOPRIL 40 MG: 40 TABLET ORAL at 13:11

## 2023-08-01 RX ADMIN — MELOXICAM 7.5 MG: 7.5 TABLET ORAL at 07:47

## 2023-08-01 RX ADMIN — AMLODIPINE BESYLATE 10 MG: 10 TABLET ORAL at 17:00

## 2023-08-01 RX ADMIN — HEPARIN SODIUM 5000 UNITS: 5000 INJECTION INTRAVENOUS; SUBCUTANEOUS at 13:11

## 2023-08-01 RX ADMIN — HYDROCODONE BITARTRATE AND ACETAMINOPHEN 1 TABLET: 5; 325 TABLET ORAL at 13:11

## 2023-08-01 RX ADMIN — CARVEDILOL 6.25 MG: 6.25 TABLET, FILM COATED ORAL at 17:00

## 2023-08-01 RX ADMIN — HYDROCODONE BITARTRATE AND ACETAMINOPHEN 1 TABLET: 5; 325 TABLET ORAL at 05:45

## 2023-08-01 RX ADMIN — SODIUM CHLORIDE, POTASSIUM CHLORIDE, SODIUM LACTATE AND CALCIUM CHLORIDE 125 ML/HR: 600; 310; 30; 20 INJECTION, SOLUTION INTRAVENOUS at 07:47

## 2023-08-01 NOTE — CASE MANAGEMENT/SOCIAL WORK
Discharge Planning Assessment  [facility]     Patient Name: Nhan Fonseca  MRN: [id number]  Today's Date: 8/1/2023    Admit Date: 7/31/2023    Plan: home   Discharge Needs Assessment       Row Name 08/01/23 0829       Living Environment    People in Home spouse    Name(s) of People in Home wife, Ranjana    Current Living Arrangements home    Primary Care Provided by self       Transition Planning    Patient/Family Anticipates Transition to home with family       Discharge Needs Assessment    Readmission Within the Last 30 Days no previous admission in last 30 days    Equipment Currently Used at Home none    Concerns to be Addressed discharge planning;basic needs                   Discharge Plan       Row Name 08/01/23 0830       Plan    Plan home    Patient/Family in Agreement with Plan yes    Plan Comments I met with the patient at the bedside. He lives with his wife in [facility]. He is independent with activities of daily living and mobility. He anticipates returning home after this hospitalization and his wife can transport. Case management will continue to follow his plan of care and assist with any discharge planning needs.    Final Discharge Disposition Code 01 - home or self-care                  Continued Care and Services - Admitted Since 7/31/2023    Coordination has not been started for this encounter.       Expected Discharge Date and Time       Expected Discharge Date Expected Discharge Time    Aug 8, 2023            Demographic Summary       Row Name 08/01/23 0829       General Information    General Information Comments I confirmed that Modesto Kelley is Mr Fonseca's PCP and he has [employer] for insurance                   Functional Status       Row Name 08/01/23 0829       Functional Status, IADL    Medications independent    Meal Preparation independent    Housekeeping independent    Laundry independent    Shopping independent                   Psychosocial    No documentation.                   Abuse/Neglect    No documentation.                  Legal    No documentation.                  Substance Abuse    No documentation.                  Patient Forms    No documentation.                     Yasmeen Nuñez RN

## 2023-08-01 NOTE — PLAN OF CARE
Goal Outcome Evaluation:  Plan of Care Reviewed With: patient        Progress: improving     Pt AOX4. S/p rt hemicolectomy. Pt rested well. Walked with standby assist. 2L NC for sats below 89% while asleep. Pain treated per mar. FC to be removed this AM.

## 2023-08-01 NOTE — PLAN OF CARE
Goal Outcome Evaluation:      Patient has rested on and off today. Patient has wife at bedside. Patients pain has been controlled with PO meds,. Gutiérrez removed this morning and patient is voiding without difficulties. NG was removed today as well no complaints of N\V. Patient has been up walking the hallways and tolerating well. Scant drainage on dressing ABD binder in place when up. Currently no s/s of distress noted at this time will continue to monitor.

## 2023-08-01 NOTE — PROGRESS NOTES
"General Surgery Post op Follow Up Note    Subjective:   Feeling a bit better today.    BP (!) 137/101 (BP Location: Left arm, Patient Position: Lying)   Pulse 83   Temp 97.6 øF (36.4 øC) (Oral)   Resp 18   Ht 185.4 cm (73\")   Wt 127 kg (279 lb 1.6 oz)   SpO2 91%   BMI 36.82 kg/mý     General Appearance:  in no acute distress  Abdomen: incision is clean and dry, dressed    CBC  Results from last 7 days   Lab Units 08/01/23  0505   WBC 10*3/mm3 17.02*   HEMOGLOBIN g/dL 14.3   HEMATOCRIT % 44.6   PLATELETS 10*3/mm3 257       CMP/BMP  Results from last 7 days   Lab Units 08/01/23  0505   SODIUM mmol/L 138   POTASSIUM mmol/L 4.3   CHLORIDE mmol/L 103   CO2 mmol/L 23.0   BUN mg/dL 12   CREATININE mg/dL 0.97   CALCIUM mg/dL 8.7   BILIRUBIN mg/dL 0.9   ALK PHOS U/L 86   ALT (SGPT) U/L 27   AST (SGOT) U/L 19   GLUCOSE mg/dL 107*         ASSESSMENT/PLAN:  Laparoscopic right hemicolectomy postoperative day 1    DC nasogastric tube, clear liquid diet.  Awaiting pathology.  Mobilize.  Hypertension resume all home medicines.    Gera Lyles MD  8/1/2023  12:46 EDT   "

## 2023-08-02 PROCEDURE — 25010000002 HEPARIN (PORCINE) PER 1000 UNITS: Performed by: SURGERY

## 2023-08-02 RX ADMIN — PANTOPRAZOLE SODIUM 40 MG: 40 TABLET, DELAYED RELEASE ORAL at 05:31

## 2023-08-02 RX ADMIN — HEPARIN SODIUM 5000 UNITS: 5000 INJECTION INTRAVENOUS; SUBCUTANEOUS at 05:31

## 2023-08-02 RX ADMIN — CARISOPRODOL 350 MG: 350 TABLET ORAL at 08:50

## 2023-08-02 RX ADMIN — CARVEDILOL 6.25 MG: 6.25 TABLET, FILM COATED ORAL at 17:26

## 2023-08-02 RX ADMIN — AMLODIPINE BESYLATE 10 MG: 10 TABLET ORAL at 17:26

## 2023-08-02 RX ADMIN — MELOXICAM 7.5 MG: 7.5 TABLET ORAL at 08:47

## 2023-08-02 RX ADMIN — HEPARIN SODIUM 5000 UNITS: 5000 INJECTION INTRAVENOUS; SUBCUTANEOUS at 14:17

## 2023-08-02 RX ADMIN — LISINOPRIL 40 MG: 40 TABLET ORAL at 08:47

## 2023-08-02 RX ADMIN — HYDROCODONE BITARTRATE AND ACETAMINOPHEN 1 TABLET: 5; 325 TABLET ORAL at 14:15

## 2023-08-02 RX ADMIN — HEPARIN SODIUM 5000 UNITS: 5000 INJECTION INTRAVENOUS; SUBCUTANEOUS at 20:58

## 2023-08-02 RX ADMIN — HYDROCODONE BITARTRATE AND ACETAMINOPHEN 1 TABLET: 5; 325 TABLET ORAL at 05:31

## 2023-08-02 NOTE — PROGRESS NOTES
"General Surgery Post op Follow Up Note    Subjective:   Feeling much better today. Passing flatus.    /96 (BP Location: Left arm, Patient Position: Lying)   Pulse 90   Temp 96.4 øF (35.8 øC) (Oral)   Resp 20   Ht 185.4 cm (73\")   Wt 127 kg (279 lb 1.6 oz)   SpO2 90%   BMI 36.82 kg/mý     General Appearance:  in no acute distress  Abdomen: incision is clean and dry, looks good     CBC  Results from last 7 days   Lab Units 08/01/23  0505   WBC 10*3/mm3 17.02*   HEMOGLOBIN g/dL 14.3   HEMATOCRIT % 44.6   PLATELETS 10*3/mm3 257       CMP/BMP  Results from last 7 days   Lab Units 08/01/23  0505   SODIUM mmol/L 138   POTASSIUM mmol/L 4.3   CHLORIDE mmol/L 103   CO2 mmol/L 23.0   BUN mg/dL 12   CREATININE mg/dL 0.97   CALCIUM mg/dL 8.7   BILIRUBIN mg/dL 0.9   ALK PHOS U/L 86   ALT (SGPT) U/L 27   AST (SGOT) U/L 19   GLUCOSE mg/dL 107*         ASSESSMENT/PLAN:  POD 2 Lap Right Hemicolectomy    Advance to a GI soft diet.  Mobilize.    Gera Lyles MD  8/2/2023  18:03 EDT   "

## 2023-08-02 NOTE — CASE MANAGEMENT/SOCIAL WORK
Continued Stay Note  [facility]     Patient Name: Nhan Fonseca  MRN: [id number]  Today's Date: 8/2/2023    Admit Date: 7/31/2023    Plan: home   Discharge Plan       Row Name 08/02/23 0948       Plan    Plan home    Patient/Family in Agreement with Plan yes    Plan Comments I met with this patient at the bedside. He is denying having any discharge needs at this time. His plan remains home at discharge. His wife can transport. CM will continue to follow.    Final Discharge Disposition Code 01 - home or self-care                   Discharge Codes    No documentation.                 Expected Discharge Date and Time       Expected Discharge Date Expected Discharge Time    Aug 8, 2023               Yasmeen Nuñez RN

## 2023-08-02 NOTE — PLAN OF CARE
Goal Outcome Evaluation:  Plan of Care Reviewed With: patient        Progress: improving     Pt has walked several times independently. Pain treated per mar. States he's ready for food.

## 2023-08-03 VITALS
WEIGHT: 279.1 LBS | HEART RATE: 78 BPM | TEMPERATURE: 97.7 F | DIASTOLIC BLOOD PRESSURE: 92 MMHG | RESPIRATION RATE: 17 BRPM | SYSTOLIC BLOOD PRESSURE: 130 MMHG | OXYGEN SATURATION: 92 % | HEIGHT: 73 IN | BODY MASS INDEX: 36.99 KG/M2

## 2023-08-03 PROBLEM — K63.89 COLONIC MASS: Status: RESOLVED | Noted: 2023-07-31 | Resolved: 2023-08-03

## 2023-08-03 LAB
CYTO UR: NORMAL
DEPRECATED RDW RBC AUTO: 49 FL (ref 37–54)
ERYTHROCYTE [DISTWIDTH] IN BLOOD BY AUTOMATED COUNT: 15.1 % (ref 12.3–15.4)
HCT VFR BLD AUTO: 43.3 % (ref 37.5–51)
HGB BLD-MCNC: 14.3 G/DL (ref 13–17.7)
LAB AP CASE REPORT: NORMAL
LAB AP CLINICAL INFORMATION: NORMAL
MCH RBC QN AUTO: 29.4 PG (ref 26.6–33)
MCHC RBC AUTO-ENTMCNC: 33 G/DL (ref 31.5–35.7)
MCV RBC AUTO: 88.9 FL (ref 79–97)
PATH REPORT.FINAL DX SPEC: NORMAL
PATH REPORT.GROSS SPEC: NORMAL
PLATELET # BLD AUTO: 197 10*3/MM3 (ref 140–450)
PMV BLD AUTO: 10.3 FL (ref 6–12)
RBC # BLD AUTO: 4.87 10*6/MM3 (ref 4.14–5.8)
WBC NRBC COR # BLD: 9.14 10*3/MM3 (ref 3.4–10.8)

## 2023-08-03 PROCEDURE — 25010000002 HEPARIN (PORCINE) PER 1000 UNITS: Performed by: SURGERY

## 2023-08-03 PROCEDURE — 85027 COMPLETE CBC AUTOMATED: CPT | Performed by: SURGERY

## 2023-08-03 RX ORDER — DOCUSATE SODIUM 250 MG
250 CAPSULE ORAL 2 TIMES DAILY
Qty: 14 CAPSULE | Refills: 0 | Status: SHIPPED | OUTPATIENT
Start: 2023-08-03

## 2023-08-03 RX ORDER — HYDROCODONE BITARTRATE AND ACETAMINOPHEN 5; 325 MG/1; MG/1
1 TABLET ORAL EVERY 8 HOURS PRN
Qty: 10 TABLET | Refills: 0 | Status: SHIPPED | OUTPATIENT
Start: 2023-08-03 | End: 2023-08-07

## 2023-08-03 RX ADMIN — MELOXICAM 7.5 MG: 7.5 TABLET ORAL at 08:48

## 2023-08-03 RX ADMIN — HYDROCODONE BITARTRATE AND ACETAMINOPHEN 1 TABLET: 5; 325 TABLET ORAL at 09:16

## 2023-08-03 RX ADMIN — HYDROCODONE BITARTRATE AND ACETAMINOPHEN 1 TABLET: 5; 325 TABLET ORAL at 04:00

## 2023-08-03 RX ADMIN — PANTOPRAZOLE SODIUM 40 MG: 40 TABLET, DELAYED RELEASE ORAL at 05:56

## 2023-08-03 RX ADMIN — LISINOPRIL 40 MG: 40 TABLET ORAL at 08:47

## 2023-08-03 RX ADMIN — HEPARIN SODIUM 5000 UNITS: 5000 INJECTION INTRAVENOUS; SUBCUTANEOUS at 05:55

## 2023-08-03 NOTE — CASE MANAGEMENT/SOCIAL WORK
Case Management Discharge Note      Final Note: Plan is home with spouse. Spouse will transport. Patient denies any discharge needs         Selected Continued Care - Admitted Since 7/31/2023       Destination    No services have been selected for the patient.                Durable Medical Equipment    No services have been selected for the patient.                Dialysis/Infusion    No services have been selected for the patient.                Home Medical Care    No services have been selected for the patient.                Therapy    No services have been selected for the patient.                Community Resources    No services have been selected for the patient.                Community & DME    No services have been selected for the patient.                         Final Discharge Disposition Code: 01 - home or self-care

## 2023-08-03 NOTE — CASE MANAGEMENT/SOCIAL WORK
Continued Stay Note  [facility]     Patient Name: Nhan Fonseca  MRN: [id number]  Today's Date: 8/3/2023    Admit Date: 7/31/2023    Plan: Home with spouse   Discharge Plan       Row Name 08/03/23 0821       Plan    Plan Home with spouse    Patient/Family in Agreement with Plan yes    Plan Comments Spoke to patient at bedside. Plan is home with spouse. Spouse will transport. Patient denies any discharge needs at this time. CM will continue to follow.    Final Discharge Disposition Code 01 - home or self-care                   Discharge Codes    No documentation.                 Expected Discharge Date and Time       Expected Discharge Date Expected Discharge Time    Aug 8, 2023               Gera Moore RN

## 2023-08-03 NOTE — PAYOR COMM NOTE
"Raisa Lima, RN  Utilization Management  P:[phone]  F:[phone]    Auth# [id number]   DC date 8/3/23  No dc summary available at this time.     Nhan Fonseca (48 y.o. Male)       Date of Birth   1975    Social Security Number       Address   [address] [place] [address]    Home Phone   [phone]    MRN   [id number]       Pentecostal   None    Marital Status                               Admission Date   7/31/23    Admission Type   Elective    Admitting Provider   Gera Lyles MD    Attending Provider   Gera Lyles MD    Department, Room/Bed   [address], S584/1       Discharge Date       Discharge Disposition       Discharge Destination                                 Attending Provider: Gera Lyles MD    Allergies: No Known Allergies    Isolation: None   Infection: None   Code Status: CPR    Ht: 185.4 cm (73\")   Wt: 127 kg (279 lb 1.6 oz)    Admission Cmt: None   Principal Problem: Colonic mass [K63.89]                   Active Insurance as of 7/31/2023       Primary Coverage       Payor Plan Insurance Group Employer/Plan Group    [employer] BLUE CROSS [facility] [employer] EMPLOYEE [id number]       Payor Plan Address Payor Plan Phone Number Payor Plan Fax Number Effective Dates    [phone]  1/1/2022 - None Entered    [place] [address]         Subscriber Name Subscriber Birth Date Member ID       NHAN FONSECA 1975 [id number]                   History & Physical        Gera Lyles MD at 07/31/23 0651            Patient Care Team:      Chief complaint: Colon mass    Subjective:  Patient is a 48 y.o.male presents with a recently discovered colon mass found during routine colonoscopy 6/18/23. He denies any previous symptoms related to the bowel. No recent changes in his general health. No previous abdominal surgery.    Review of Systems:  General ROS: negative  Cardiovascular ROS: " negative  Respiratory ROS: no cough, shortness of breath, or wheezing      Allergies: No Known Allergies       Latex:no  Contrast Dye: no    Home Meds    Medications Prior to Admission   Medication Sig Dispense Refill Last Dose    amLODIPine (NORVASC) 10 MG tablet Take 1 tablet by mouth Daily.       carvedilol (COREG) 6.25 MG tablet Take 1 tablet by mouth Daily.       lisinopril (PRINIVIL,ZESTRIL) 40 MG tablet Take 1 tablet by mouth Every Afternoon.       omeprazole (priLOSEC) 20 MG capsule Take 1 capsule by mouth Daily.       POTASSIUM PO Take 99 mg by mouth Daily.        PMH:   Past Medical History:   Diagnosis Date    GERD (gastroesophageal reflux disease)     History of heart murmur in childhood     at age 12    Hypertension      PSH:    Past Surgical History:   Procedure Laterality Date    COLONOSCOPY      TONSILLECTOMY      WISDOM TOOTH EXTRACTION       Immunization History: pneumo: no   Flu: no  Tetanus: yes  Social History:   Tobacco: yes   Alcohol: yes      Physical Exam:There were no vitals taken for this visit.      General Appearance:    Alert, cooperative, no distress, appears stated age   Head:    Normocephalic, without obvious abnormality, atraumatic   Lungs:     Clear to auscultation bilaterally, respirations unlabored    Heart: Regular rate and rhythm, S1 and S2 normal, no murmur, rub    or gallop    Abdomen:    Soft without tenderness, obese   Breast Exam:    deferred   Genitalia:    deferred   Extremities:   Extremities normal, atraumatic, no cyanosis or edema   Skin:   Skin color, texture, turgor normal, no rashes or lesions   Neurologic:   Grossly intact     Results Review: CBC, Chem profile, EKG on chart.    Impression: Colon mass    Plan: For right laparoscopic hemicolectomy possible open, today    MONTANA Bonilla 7/31/2023 06:52 EDT    I have reviewed the above note, my prior note(s), appropriate imaging, and labs.  I have again discussed the risks and benefits of laparoscopic right  hemicolectomy possible open with the patient.  All of their questions have been answered.  They understand and wish to proceed with surgical intervention.                     Electronically signed by Gera Lyles MD at 07/31/23 0753       Current Facility-Administered Medications   Medication Dose Route Frequency Provider Last Rate Last Admin    acetaminophen (TYLENOL) tablet 650 mg  650 mg Oral Q4H PRN Gera Lyles MD        amLODIPine (NORVASC) tablet 10 mg  10 mg Oral Daily With Dinner Gera Lyles MD   10 mg at 08/02/23 1726    carisoprodol (SOMA) tablet 350 mg  350 mg Oral Q12H PRN Gera Lyles MD   350 mg at 08/02/23 0850    carvedilol (COREG) tablet 6.25 mg  6.25 mg Oral Daily With Dinner Gera Lyles MD   6.25 mg at 08/02/23 1726    heparin (porcine) 5000 UNIT/ML injection 5,000 Units  5,000 Units Subcutaneous Q8H Gera Lyles MD   5,000 Units at 08/03/23 0555    HYDROcodone-acetaminophen (NORCO) 5-325 MG per tablet 1 tablet  1 tablet Oral Q4H PRN Gera Lyles MD   1 tablet at 08/03/23 0400    lisinopril (PRINIVIL,ZESTRIL) tablet 40 mg  40 mg Oral Q24H Gera Lyles MD   40 mg at 08/03/23 0847    meloxicam (MOBIC) tablet 7.5 mg  7.5 mg Oral Daily Gera Lyles MD   7.5 mg at 08/03/23 0848    naloxone (NARCAN) injection 0.4 mg  0.4 mg Intravenous Q5 Min PRN Gera Lyles MD        ondansetron (ZOFRAN) tablet 4 mg  4 mg Oral Q6H PRN Gera Lyles MD        pantoprazole (PROTONIX) EC tablet 40 mg  40 mg Oral Q AM Gera Lyles MD   40 mg at 08/03/23 0556    promethazine (PHENERGAN) tablet 6.25 mg  6.25 mg Oral Q6H PRN Gera Lyles MD        Or    promethazine (PHENERGAN) suppository 6.25 mg  6.25 mg Rectal Q6H PRN Gera Lyles MD         Lab Results (all)       Procedure Component Value Units Date/Time    Tissue Pathology Exam [[id number]] Collected: 07/31/23 1102  "   Specimen: Tissue from Large Intestine, Right / Ascending Colon Updated: 08/03/23 0811     Case Report --     Surgical Pathology Report                         Case: [id number]                                  Authorizing Provider:  Gera Lyles MD Collected:           07/31/2023 11:06 AM          Ordering Location:     [facility]   Received:            07/31/2023 01:41 PM                                 OR                                                                           Pathologist:           Rafi Brown MD                                                       Specimen:    Large Intestine, Right / Ascending Colon, Right colon for permanent                         Clinical Information --     Colonic mass         Final Diagnosis --     RIGHT COLON, HEMICOLECTOMY:     Tubular adenoma with focal high-grade dysplasia     No evidence of invasive carcinoma     Eleven lymph nodes, negative for metastatic carcinoma     The surgical margins are free of dysplasia         Gross Description --     1. Large Intestine, Right / Ascending Colon.  Received in formalin labeled \"right colon for permanent\", is a right hemicolectomy which has the following measurements: Ascending colon-15.5 cm in length by 2.7-5.0 cm in diameter, terminal ileum-9.0 cm in length by 2.2 cm in diameter, appendix-7.7 cm in length by 0.6 cm diameter.  The colon is received with an abundant amount of mesocolonic adipose.  The serosa is tan, ragged and intact.  No perforations are identified.  Opening of the bowel reveals a 4.0 x 3.3 x 1.8 cm polypoid, fungating mass that is located 2.0 cm from the ileocecal valve, 10.1 cm from the proximal terminal ileal margin and 12.2 cm from the distal colonic margin.  The serosa overlying this mass is inked blue and the mesenteric margin is inked orange.  Further sectioning reveals pink-white, polypoidal cut surfaces with a fibrovascular core.  No gross invasion is identified.  " The mass is located 0.4 cm from the blue inked serosa and 7.6 cm from the mesenteric margin.  The remaining mucosa is white-tan, normally folded and significant for a 0.5 x 0.4 x 0.1 cm sessile polyp which comes to within 5.4 cm of the distal colonic margin and is located 6.6 cm from the previously mentioned mass.  The bowel wall thickness measures up to 0.3 cm.    Sectioning through the appendix reveals pink-tan, focally hemorrhagic mucosa.  The appendiceal lumen measures up to 0.2 cm and contains focal hemorrhagic fecal material.  No fecaliths are identified.  The appendiceal wall thickness measures up to 0.1 cm.    Sectioning through the mesocolonic adipose reveals 12 candidate nodes that range from 0.1-1.1 cm.  Sectioning of the largest node reveals gray-white, homogenous and glistening cut surfaces.  Representative sections are submitted as follows:  1A: Proximal terminal ileal margin, en face  1B: Distal colonic margin, en face  1C: Mesenteric margin, en face  1D-1K: Entire mass (1H = mass to closest serosal surface)  1L: Sessile polyp   1M: Appendix to include bisected distal tip  1N: Appendiceal orifice  1O: Unremarkable colonic and ileal mucosa  1P: Largest candidate node, bisected  1Q: 4 intact candidate nodes  1R: 4 intact candidate nodes  1S: 3 intact candidate nodes  1T-1U: Adipose to include vasculature   MLA         Microscopic Description --     The slides are reviewed and demonstrate histopathologic features supporting the above rendered diagnosis.        CBC (No Diff) [[id number]]  (Normal) Collected: 08/03/23 0318    Specimen: Blood Updated: 08/03/23 0450     WBC 9.14 10*3/mm3      RBC 4.87 10*6/mm3      Hemoglobin 14.3 g/dL      Hematocrit 43.3 %      MCV 88.9 fL      MCH 29.4 pg      MCHC 33.0 g/dL      RDW 15.1 %      RDW-SD 49.0 fl      MPV 10.3 fL      Platelets 197 10*3/mm3     Comprehensive Metabolic Panel [[id number]]  (Abnormal) Collected: 08/01/23 0505    Specimen: Blood Updated: 08/01/23  0543     Glucose 107 mg/dL      BUN 12 mg/dL      Creatinine 0.97 mg/dL      Sodium 138 mmol/L      Potassium 4.3 mmol/L      Chloride 103 mmol/L      CO2 23.0 mmol/L      Calcium 8.7 mg/dL      Total Protein 6.5 g/dL      Albumin 3.8 g/dL      ALT (SGPT) 27 U/L      AST (SGOT) 19 U/L      Alkaline Phosphatase 86 U/L      Total Bilirubin 0.9 mg/dL      Globulin 2.7 gm/dL      Comment: Calculated Result        A/G Ratio 1.4 g/dL      BUN/Creatinine Ratio 12.4     Anion Gap 12.0 mmol/L      eGFR 95.1 mL/min/1.73     Narrative:      GFR Normal >60  Chronic Kidney Disease <60  Kidney Failure <15      CBC & Differential [[id number]]  (Abnormal) Collected: 08/01/23 0505    Specimen: Blood Updated: 08/01/23 0522    Narrative:      The following orders were created for panel order CBC & Differential.  Procedure                               Abnormality         Status                     ---------                               -----------         ------                     CBC Auto Differential[[id number]]        Abnormal            Final result                 Please view results for these tests on the individual orders.    CBC Auto Differential [[id number]]  (Abnormal) Collected: 08/01/23 0505    Specimen: Blood Updated: 08/01/23 0522     WBC 17.02 10*3/mm3      RBC 5.09 10*6/mm3      Hemoglobin 14.3 g/dL      Hematocrit 44.6 %      MCV 87.6 fL      MCH 28.1 pg      MCHC 32.1 g/dL      RDW 15.2 %      RDW-SD 48.8 fl      MPV 9.9 fL      Platelets 257 10*3/mm3      Neutrophil % 85.4 %      Lymphocyte % 6.2 %      Monocyte % 7.7 %      Eosinophil % 0.0 %      Basophil % 0.1 %      Immature Grans % 0.6 %      Neutrophils, Absolute 14.54 10*3/mm3      Lymphocytes, Absolute 1.06 10*3/mm3      Monocytes, Absolute 1.31 10*3/mm3      Eosinophils, Absolute 0.00 10*3/mm3      Basophils, Absolute 0.01 10*3/mm3      Immature Grans, Absolute 0.10 10*3/mm3      nRBC 0.0 /100 WBC                Operative/Procedure Notes (all)         Gera Lyles MD at 07/31/23 0830  Version 1 of 1         General Surgery Operative Note    Nhan Fonseca  [id number]  1975    Date of Surgery:  7/31/2023 12:27 EDT    Pre-op Diagnosis: Right colonic mass    Post-op Diagnosis: Right colonic mass    Procedure: Laparoscopic right hemicolectomy    Surgeon: Gera Lyles MD    Circulator: Christy Dodd RN; Tara Gardner RN  Scrub Person: Mery Mancuso; Darcy Montesinos  Nursing Assistant: Velvet Shaw PCT  Assistant: Scooter Doyle PA     Assistant: Scooter Doyle PA  was responsible for performing the following activities: Retraction, Suction, Irrigation, Suturing, and Closing and their skilled assistance was necessary for the success of this case.    Anesthesia: General with bilateral tap blocks    Fluids: 1200 mL of crystalloid    Estimated Blood Loss: Less than 100 mL    Urine Voided: Greater than 100 mL    Specimens: Right colon                  Drains: None    Findings: Right colon mass, tattooed                   No obvious colon lesions noted    Complications: None apparent    History:   48-year-old gentleman underwent colonoscopy which demonstrated a mass in the cecum.      The risks and benefits of laparoscopic right hemicolectomy were rehashed.  Our discussion included but was not limited to: bleeding, infection, injury to adjacent viscera (duodenum, right ureter, liver, gallbladder etc.), anastomotic leak, need for reintervention, an open operation in general, and medical issues from a cardiopulmonary and deep venous thrombosis standpoint.  All questions were answered and they understood and wished to proceed with surgical intervention.    Procedure:      After informed consent, the patient was taken to the operating room and placed in the supine position.  Appropriate antibiotic prophylaxis was given to the patient. General anesthesia was induced, bilateral TAP blocks were placed by anesthesia, a Gutiérrez catheter  was placed, the abdomen was then prepped and draped in the standard sterile fashion. An Ioban was placed on the skin. A time out was observed.     The operation began with a infraumbilical New trocar cutdown skin was anesthetized and incised I dissected down to the fascia bluntly.  The fascia was grasped with a Mainor clamp and incised through the peritoneum under direct visualization and bilateral Vicryl fascial sutures were placed.  Patient was placed in the headdown position and a suprapubic and left lower quadrant 5 mm trocars were placed in the standard fashion under direct visualization.  He was then rotated to his left lateral side.  The appendix was visualized and was adherent to the retroperitoneum on the right.  This was dissected free from the surrounding tissue with the [place] LigaSure and retracted cephalad toward the head of the patient and anteriorly.  This tented the ileocolic vessel and the mesentery inferior to this was scored with the electrocautery Bovie.  With the assistance of the [place] LigaSure the retroperitoneal dissection took place up to the level of the duodenum.  The duodenum was swept down into the right.  I then released the right colon from the white line of Toldt with the [place] LigaSure up to the level of the hepatic flexure.  I placed a upper midline 5 mm trocar to assist with retraction and elevation of the transverse colon and omentum.  The omentum was freed from the colon over to the hepatic flexure.  The attachments anterior to the duodenum were swept down the duodenum was well visualized and uninjured.  There were some attachments around the gallbladder which were taken down with the [place] LigaSure.  At this point the colon was well mobilized.  I upsized the upper midline 5 mm trocar to a 11 mm trocar to allow the stapler for the ileocolic vessels in.  I then took the ileocolic vessels with a 35 vascular WEI stapler.  The right colon easily reached the midline.  I  then created a periumbilical midline incision in standard fashion.  The Garrett wound protector was placed in standard fashion.  The appendix, right colon, and small bowel were delivered through into the wound.  The specimen incorporated the tattooing quite well.  The colon was then taken with a 75 WEI stapler along with the small bowel in standard fashion.  The stapled ends were oversewn with imbricating 3-0 silk suture.  An side-to-side stapled anastomosis was performed in the standard fashion.  The enterotomy was closed with a layer of a running 3-0 PDS followed by imbricating silk suture, 3 -0.  The right colon was passed off for permanent pathology.  The anastomosis was placed back into the abdomen.  The midline fascial defect was closed with interrupted 0 Vicryl suture.  The abdomen was then reinsufflated.  The anastomosis appeared in good order.  The right upper quadrant was copiously irrigated.  Meticulous hemostasis was obtained.  I did not place a drain.  All trocars were removed under direct visualization.  The upper midline 11 mm port fascial defect was closed with 0 Vicryl.  All skin incisions were closed with 3-0 Vicryl followed by 4-0 Monocryl.  Mastisol, Steri-Strips, Telfa and Tegaderm were placed on the wounds.  The patient tolerated the procedure well and was extubated and transferred to the recovery room in stable condition.     Sterile dressings were placed on the wound.  All lap and needle counts were reported as correct at the end of the procedure x2.  The patient was then transferred to the PACU.    Gera Lyles MD     Date: 7/31/2023  Time: 12:27 EDT     Electronically signed by Gera Lyles MD at 07/31/23 1237          Physician Progress Notes (all)        Gera Lyles MD at 08/02/23 1803          General Surgery Post op Follow Up Note    Subjective:   Feeling much better today. Passing flatus.    /96 (BP Location: Left arm, Patient Position: Lying)    "Pulse 90   Temp 96.4 øF (35.8 øC) (Oral)   Resp 20   Ht 185.4 cm (73\")   Wt 127 kg (279 lb 1.6 oz)   SpO2 90%   BMI 36.82 kg/mý     General Appearance:  in no acute distress  Abdomen: incision is clean and dry, looks good     CBC  Results from last 7 days   Lab Units 08/01/23  0505   WBC 10*3/mm3 17.02*   HEMOGLOBIN g/dL 14.3   HEMATOCRIT % 44.6   PLATELETS 10*3/mm3 257       CMP/BMP  Results from last 7 days   Lab Units 08/01/23  0505   SODIUM mmol/L 138   POTASSIUM mmol/L 4.3   CHLORIDE mmol/L 103   CO2 mmol/L 23.0   BUN mg/dL 12   CREATININE mg/dL 0.97   CALCIUM mg/dL 8.7   BILIRUBIN mg/dL 0.9   ALK PHOS U/L 86   ALT (SGPT) U/L 27   AST (SGOT) U/L 19   GLUCOSE mg/dL 107*         ASSESSMENT/PLAN:  POD 2 Lap Right Hemicolectomy    Advance to a GI soft diet.  Mobilize.    Gera Lyles MD  8/2/2023  18:03 EDT     Electronically signed by Gera Lyles MD at 08/02/23 1805       Gera Lyles MD at 08/01/23 1245          General Surgery Post op Follow Up Note    Subjective:   Feeling a bit better today.    BP (!) 137/101 (BP Location: Left arm, Patient Position: Lying)   Pulse 83   Temp 97.6 øF (36.4 øC) (Oral)   Resp 18   Ht 185.4 cm (73\")   Wt 127 kg (279 lb 1.6 oz)   SpO2 91%   BMI 36.82 kg/mý     General Appearance:  in no acute distress  Abdomen: incision is clean and dry, dressed    CBC  Results from last 7 days   Lab Units 08/01/23  0505   WBC 10*3/mm3 17.02*   HEMOGLOBIN g/dL 14.3   HEMATOCRIT % 44.6   PLATELETS 10*3/mm3 257       CMP/BMP  Results from last 7 days   Lab Units 08/01/23  0505   SODIUM mmol/L 138   POTASSIUM mmol/L 4.3   CHLORIDE mmol/L 103   CO2 mmol/L 23.0   BUN mg/dL 12   CREATININE mg/dL 0.97   CALCIUM mg/dL 8.7   BILIRUBIN mg/dL 0.9   ALK PHOS U/L 86   ALT (SGPT) U/L 27   AST (SGOT) U/L 19   GLUCOSE mg/dL 107*         ASSESSMENT/PLAN:  Laparoscopic right hemicolectomy postoperative day 1    DC nasogastric tube, clear liquid diet.  Awaiting " pathology.  Mobilize.  Hypertension resume all home medicines.    Gera Lyles MD  8/1/2023  12:46 EDT     Electronically signed by Gera Lyles MD at 08/01/23 1247            Signed         Goal Outcome Evaluation:   A&Ox4. VSS. RA. BM x1. Ambulating well. PIV removed. Discharge teaching done. Wife to transport home. Safety maintained.

## 2023-08-03 NOTE — PLAN OF CARE
Goal Outcome Evaluation:            A&Ox4. VSS. RA. BM x1. Ambulating well. PIV removed. Discharge teaching done. Wife to transport home. Safety maintained.

## 2023-08-07 NOTE — DISCHARGE SUMMARY
General Surgery Discharge Note (Dr. IVÁN Lyles)    Provider, No Known    Patient Name:  Nhan Fonseca  Admission Date:  7/31/2023  Discharge Date:  8/3/2023    Diagnosis/Problems:  Problems Addressed this Visit          Gastrointestinal Abdominal     RESOLVED: Colonic mass    Relevant Medications    HYDROcodone-acetaminophen (NORCO) 5-325 MG per tablet    Other Relevant Orders    Tissue Pathology Exam (Completed)     Diagnoses         Codes Comments    Colonic mass     ICD-10-CM: K63.89  ICD-9-CM: 569.89             History & Hospital Course: 48-year-old gentleman with a history of a large tubulovillous adenoma of the right colon/cecum presented electively for right hemicolectomy.  On 7/31/2023 he underwent an uneventful laparoscopic right hemicolectomy with primary anastomosis.  His postoperative course was uneventful. The patient is tolerating an appropriate diet, is having GI function, and is ready to be discharged.    Physical: appropriate post operative examination.      Assessment:  Doing well may DC home.    Plan:    DC Home  Diet: Regular diet as tolerated  Restrictions: No heavy lifting greater than 10 lbs 6 weeks.  May shower as needed, no tub bathes.  Do not submerge the incisions underwater.    Final pathology: Tubulovillous adenoma w/ high-grade dysplasia, NO evidence of invasive disease seen    Prescriptions: Norco 5 mg tablets #10, no refills                           Colace twice daily x 7 days.    May resume all prior home medications.  No current facility-administered medications on file prior to encounter.     No current outpatient medications on file prior to encounter.      Follow up in 2-3 weeks at [facility], [phone].    Gera Lyles MD,  8/7/2023 - 11:23 EDT

## (undated) DEVICE — SUT VIC 3/0 TIES J104T

## (undated) DEVICE — SUT SILK 3/0 SH CR8 18IN C013D

## (undated) DEVICE — BLD SCLPL SS NO24 STRL

## (undated) DEVICE — ENDOPATH 5MM ENDOSCOPIC BLUNT TIP DISSECTORS (12 POUCHES CONTAINING 3 DISSECTORS EACH): Brand: ENDOPATH

## (undated) DEVICE — ENDOPATH XCEL BLUNT TIP TROCARS WITH SMOOTH SLEEVES: Brand: ENDOPATH XCEL

## (undated) DEVICE — SPNG LAP PREWSH SFTPK 18X18IN STRL PK/5

## (undated) DEVICE — PK LAP LASR CHOLE 10

## (undated) DEVICE — WOUND RETRACTOR AND PROTECTOR: Brand: ALEXIS WOUND PROTECTOR-RETRACTOR

## (undated) DEVICE — ENDOPATH XCEL BLADELESS TROCARS WITH STABILITY SLEEVES: Brand: ENDOPATH XCEL

## (undated) DEVICE — GLV SURG PREMIERPRO MIC LTX PF SZ8 BRN

## (undated) DEVICE — [HIGH FLOW INSUFFLATOR,  DO NOT USE IF PACKAGE IS DAMAGED,  KEEP DRY,  KEEP AWAY FROM SUNLIGHT,  PROTECT FROM HEAT AND RADIOACTIVE SOURCES.]: Brand: PNEUMOSURE

## (undated) DEVICE — SAFESECURE,SECUREMENT,FOLEY CATH,STERILE: Brand: MEDLINE

## (undated) DEVICE — TRAP FLD MINIVAC MEGADYNE 100ML

## (undated) DEVICE — MARYLAND JAW LAPAROSCOPIC SEALER/DIVIDER COATED: Brand: LIGASURE

## (undated) DEVICE — 450 ML BOTTLE OF 0.05% CHLORHEXIDINE GLUCONATE IN 99.95% STERILE WATER FOR IRRIGATION, USP AND APPLICATOR.: Brand: IRRISEPT ANTIMICROBIAL WOUND LAVAGE

## (undated) DEVICE — ANTIBACTERIAL UNDYED BRAIDED (POLYGLACTIN 910), SYNTHETIC ABSORBABLE SUTURE: Brand: COATED VICRYL

## (undated) DEVICE — SUT VIC PLS CTD ANTIB 2/0 18IN VCP111G

## (undated) DEVICE — ENDOPATH XCEL UNIVERSAL TROCAR STABLILITY SLEEVES: Brand: ENDOPATH XCEL

## (undated) DEVICE — APPL CHLORAPREP TINTED 26ML TEAL

## (undated) DEVICE — MEDI-VAC YANKAUER SUCTION HANDLE: Brand: CARDINAL HEALTH

## (undated) DEVICE — TBG PENCL TELESCP MEGADYNE SMOKE EVAC 10FT

## (undated) DEVICE — TOWEL,OR,DSP,ST,NATURAL,DLX,4/PK,20PK/CS: Brand: MEDLINE

## (undated) DEVICE — INTENDED FOR TISSUE SEPARATION, AND OTHER PROCEDURES THAT REQUIRE A SHARP SURGICAL BLADE TO PUNCTURE OR CUT.: Brand: BARD-PARKER ® STAINLESS STEEL BLADES

## (undated) DEVICE — TUBING, SUCTION, 1/4" X 10', STRAIGHT: Brand: MEDLINE

## (undated) DEVICE — SUT VIC 0 UR6 27IN VCP603H

## (undated) DEVICE — LAPAROSCOPIC SMOKE FILTRATION SYSTEM: Brand: PALL LAPAROSHIELD® PLUS LAPAROSCOPIC SMOKE FILTRATION SYSTEM

## (undated) DEVICE — LAPAROVUE VISIBILITY SYSTEM LAPAROSCOPIC SOLUTIONS: Brand: LAPAROVUE

## (undated) DEVICE — SUT PDS 2 MONO 3/0 VIL 27IN Z305H

## (undated) DEVICE — ECHELON FLEX  POWERED VASCULAR STAPLER WITH ADVANCED PLACEMENT TIP, 35MM: Brand: ECHELON FLEX

## (undated) DEVICE — TOTAL TRAY, 16FR 10ML SIL FOLEY, URN: Brand: MEDLINE

## (undated) DEVICE — DRSNG SURESITE WNDW 4X4.5